# Patient Record
Sex: MALE | Race: WHITE | URBAN - METROPOLITAN AREA
[De-identification: names, ages, dates, MRNs, and addresses within clinical notes are randomized per-mention and may not be internally consistent; named-entity substitution may affect disease eponyms.]

---

## 2023-02-23 ENCOUNTER — OFFICE VISIT (OUTPATIENT)
Dept: URBAN - METROPOLITAN AREA CLINIC 50 | Facility: CLINIC | Age: 68
End: 2023-02-23
Payer: MEDICARE

## 2023-02-23 DIAGNOSIS — H43.812 VITREOUS DEGENERATION, LEFT EYE: ICD-10-CM

## 2023-02-23 DIAGNOSIS — H25.13 AGE-RELATED NUCLEAR CATARACT, BILATERAL: Primary | ICD-10-CM

## 2023-02-23 DIAGNOSIS — H52.4 PRESBYOPIA: ICD-10-CM

## 2023-02-23 PROCEDURE — 99204 OFFICE O/P NEW MOD 45 MIN: CPT | Performed by: OPTOMETRIST

## 2023-02-23 ASSESSMENT — VISUAL ACUITY
OD: 20/20
OS: 20/20

## 2023-02-23 ASSESSMENT — INTRAOCULAR PRESSURE
OS: 19
OD: 20

## 2023-02-23 NOTE — IMPRESSION/PLAN
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 Plan: Discussed diagnosis in detail with patient. Reassured patient of current condition and treatment. Discussed signs and symptoms of PVD/floaters. There is no evidence of retinal pathology. All signs and risks of retinal detachment and tears were discussed in detail. Call if symptoms worsen or if new symptoms arise.